# Patient Record
Sex: MALE
[De-identification: names, ages, dates, MRNs, and addresses within clinical notes are randomized per-mention and may not be internally consistent; named-entity substitution may affect disease eponyms.]

---

## 2019-07-10 ENCOUNTER — HOSPITAL ENCOUNTER (EMERGENCY)
Dept: HOSPITAL 89 - ER | Age: 39
Discharge: HOME | End: 2019-07-10
Payer: MEDICAID

## 2019-07-10 VITALS — SYSTOLIC BLOOD PRESSURE: 138 MMHG | DIASTOLIC BLOOD PRESSURE: 80 MMHG

## 2019-07-10 DIAGNOSIS — T70.29XA: Primary | ICD-10-CM

## 2019-07-10 LAB — PLATELET COUNT, AUTOMATED: 238 K/UL (ref 150–450)

## 2019-07-10 PROCEDURE — 84132 ASSAY OF SERUM POTASSIUM: CPT

## 2019-07-10 PROCEDURE — 82310 ASSAY OF CALCIUM: CPT

## 2019-07-10 PROCEDURE — 82565 ASSAY OF CREATININE: CPT

## 2019-07-10 PROCEDURE — 82375 ASSAY CARBOXYHB QUANT: CPT

## 2019-07-10 PROCEDURE — 84460 ALANINE AMINO (ALT) (SGPT): CPT

## 2019-07-10 PROCEDURE — 82247 BILIRUBIN TOTAL: CPT

## 2019-07-10 PROCEDURE — 84520 ASSAY OF UREA NITROGEN: CPT

## 2019-07-10 PROCEDURE — 96374 THER/PROPH/DIAG INJ IV PUSH: CPT

## 2019-07-10 PROCEDURE — 71046 X-RAY EXAM CHEST 2 VIEWS: CPT

## 2019-07-10 PROCEDURE — 85025 COMPLETE CBC W/AUTO DIFF WBC: CPT

## 2019-07-10 PROCEDURE — 84155 ASSAY OF PROTEIN SERUM: CPT

## 2019-07-10 PROCEDURE — 82947 ASSAY GLUCOSE BLOOD QUANT: CPT

## 2019-07-10 PROCEDURE — 82435 ASSAY OF BLOOD CHLORIDE: CPT

## 2019-07-10 PROCEDURE — 82374 ASSAY BLOOD CARBON DIOXIDE: CPT

## 2019-07-10 PROCEDURE — 84075 ASSAY ALKALINE PHOSPHATASE: CPT

## 2019-07-10 PROCEDURE — 99283 EMERGENCY DEPT VISIT LOW MDM: CPT

## 2019-07-10 PROCEDURE — 84450 TRANSFERASE (AST) (SGOT): CPT

## 2019-07-10 PROCEDURE — 84295 ASSAY OF SERUM SODIUM: CPT

## 2019-07-10 PROCEDURE — 96361 HYDRATE IV INFUSION ADD-ON: CPT

## 2019-07-10 PROCEDURE — 85379 FIBRIN DEGRADATION QUANT: CPT

## 2019-07-10 PROCEDURE — 82040 ASSAY OF SERUM ALBUMIN: CPT

## 2019-07-10 NOTE — ER REPORT
History and Physical


Time Seen By MD:  15:25


HPI/ROS


CHIEF COMPLAINT: dizzy, headache, sob





HISTORY OF PRESENT ILLNESS: PT is a  who is driving thru from 


[place] to [place].  Pt states he started feeling poorly when he hi


t [place]. States he is dizzy, nauseated, sob, headache and tired.  PT states 


symptoms have not improved from [place] to [place] so he stopped in the ED.  Pt 


states he usually does not drive this way. PT is not on any prophylaxis for  


altitude provention. No chest pain. 





REVIEW OF SYSTEMS:


Constitutional: No fever, no chills.


Eyes: No discharge.


ENT: No sore throat. 


Cardiovascular: No chest pain, no palpitations.


Respiratory: No cough, + shortness of breath.


Gastrointestinal: No abdominal pain, no vomiting.


Genitourinary: No hematuria.


Musculoskeletal: No back pain.


Skin: No rashes.


Neurological: + headache, + dizzy


Allergies:  


Coded Allergies:  


     No Known Drug Allergies (Unverified , 7/10/19)


Home Meds


No Active Prescriptions or Reported Meds


Past Medical/Surgical History


Pmhx: boarderline HTN, hemorrhoids


Hx Smoking:  No


Hx Alcohol Use:  Yes (Rarely, twice a month)


Constitutional





Vital Sign - Last 24 Hours








 7/10/19





 15:26


 


Temp 97.8


 


Pulse 81


 


Resp 20


 


B/P (MAP) 130/86


 


Pulse Ox 93


 


O2 Delivery Room Air








Physical Exam


General Appearance: The patient is alert, has no immediate need for airway 


protection and no signs of toxicity.


Eyes: Pupils equal and round no pallor or injection, EOMI


ENT:  no pharyngeal erythema or exudates, Mucous membranes are moist, TM are nl 


b/l


Respiratory: There are no retractions, lungs are clear to auscultation.


Cardiovascular: Regular rate and rhythm. pulses are equal and symmetrical


Gastrointestinal:  Abdomen is soft and non tender, no masses, bowel sounds 


normal, no guarding, no rigidity or rebound


Neurological: Cranial nerves II-XII grossly intact, no sensory or motor loss


Skin: Warm and dry, no rashes.


Musculoskeletal: Neck is supple non tender, no vertebral tenderness


Extremities are nontender, nonswollen and have full range of motion.








DIFFERENTIAL DIAGNOSIS: After history and physical exam differential diagnosis 


was considered for altitude sickness, dehydration, electrolylte abnl, pneumonia,


pe





Medical Decision Making


Data Points


Result Diagram:  


7/10/19 1528                                                                    


           7/10/19 1528





Laboratory





Hematology








Test


 7/10/19


15:28


 


White Blood Count


 13.1 k/uL


(4.5-11.0)  H


 


Red Blood Count


 5.60 M/uL


(4.00-5.60)


 


Hemoglobin


 15.4 g/dL


(14.0-18.0)


 


Hematocrit


 45.4 %


(42.0-52.0)


 


Mean Corpuscular Volume


 81.1 fL


(80.0-96.0)


 


Mean Corpuscular Hemoglobin


 27.5 pg


(26.0-33.0)


 


Mean Corpuscular Hemoglobin


Concent 33.9 g/dL


(32.0-36.0)


 


Red Cell Distribution Width


 14.1 %


(11.5-14.5)


 


Platelet Count


 238 K/uL


(150-450)


 


Mean Platelet Volume


 8.8 fL


(7.2-11.1)


 


Neutrophils (%) (Auto)


 73.0 %


(39.4-72.5)  H


 


Lymphocytes (%) (Auto)


 21.3 %


(17.6-49.6)


 


Monocytes (%) (Auto)


 4.1 %


(4.1-12.4)


 


Eosinophils (%) (Auto)


 1.3 %


(0.4-6.7)


 


Basophils (%) (Auto)


 0.3 %


(0.3-1.4)


 


Nucleated RBC Relative Count


(auto) 0.1 /100WBC  





 


Neutrophils # (Auto)


 9.6 K/uL


(2.0-7.4)  H


 


Lymphocytes # (Auto)


 2.8 K/uL


(1.3-3.6)


 


Monocytes # (Auto)


 0.5 K/uL


(0.3-1.0)


 


Eosinophils # (Auto)


 0.2 K/uL


(0.0-0.5)


 


Basophils # (Auto)


 0.0 K/uL


(0.0-0.1)


 


Nucleated RBC Absolute Count


(auto) 0.01 K/uL  











Chemistry








Test


 7/10/19


15:28


 


Sodium Level


 137 mmol/L


(137-145)


 


Potassium Level


 3.6 mmol/L


(3.5-5.0)


 


Chloride Level


 102 mmol/L


()


 


Carbon Dioxide Level


 24 mmol/L


(22-30)


 


Blood Urea Nitrogen


 13 mg/dl


(9-21)


 


Creatinine


 1.00 mg/dl


(0.66-1.25)


 


Glomerular Filtration Rate


Calc > 60.0 





 


Random Glucose


 123 mg/dl


()


 


Calcium Level


 9.0 mg/dl


(8.4-10.2)


 


Total Bilirubin


 0.5 mg/dl


(0.2-1.3)


 


Aspartate Amino Transf


(AST/SGOT) 33 U/L (0-35) 





 


Alanine Aminotransferase


(ALT/SGPT) 47 U/L (0-56) 





 


Alkaline Phosphatase


 103 U/L


(0-126)


 


Total Protein


 7.9 g/dl


(6.3-8.2)


 


Albumin


 4.2 g/dl


(3.5-5.0)








Coagulation








Test


 7/10/19


15:28


 


D-Dimer Quantitative (PE/DVT)


 < 0.27 ug/ml


(0-0.50)











EKG/Imaging


Imaging


napd





ED Course/Re-evaluation


Clinical Indication for ER IV:  Hydration, IV Access


ED Course


check labs, start acetazolamide





 07/10/2019 4:49:13 pm Pt feeling slightly better after fluids and medication 


but still has some symptoms.  Pts labs are stable. Pts symptoms consistent with 


acute altitude sickness. Spoke [facility] pt and he was going to stay in [facility] but now thinks he will drive a little further west to go to a lower 


altitude. will write for acetazolemide to take on his way back home.


Decision to Disposition Date:  Jul 10, 2019


Decision to Disposition Time:  16:50





Depart


Departure


Latest Vital Signs





Vital Signs








  Date Time  Temp Pulse Resp B/P (MAP) Pulse Ox O2 Delivery O2 Flow Rate FiO2


 


7/10/19 15:26 97.8 81 20 130/86 93 Room Air  








Impression:  


   Primary Impression:  


   Altitude sickness


Condition:  Improved


Disposition:  HOME OR SELF-CARE


New Scripts


Acetazolamide (ACETAZOLAMIDE) 250 Mg Tablet


250 MG PO BID, #14 TAB


   Prov: MYCHAL ELLISON DO         7/10/19


Patient Instructions:  Mountain Sickness (ED)





Additional Instructions:  


Your symptoms are most likely related to the high altitude (7,300 feet) above 


sea level. 


The treatment is to move to a lower altitude. 


You may also use tylenol for your headache.


You can take acetazolamide twice a day to help reduce your symptoms.   You do 


not need to take it if you are not having symptoms or if you move to a lower 


altitude.  If you need to drive back thru [place] or [place] I recommend you start 


the acetazolamide a day before you reach the high altitude region.





Problem Qualifiers








   Primary Impression:  


   Altitude sickness


   Encounter type:  initial encounter  Qualified Codes:  T70.29XA - Other 


   effects of high altitude, initial encounter








MYCHAL ELLISON DO            Jul 10, 2019 15:27

## 2019-07-10 NOTE — RADIOLOGY IMAGING REPORT
FACILITY: [facility] - [place] 

 

PATIENT NAME: Gallo Whatley

: 1980

MR: [id number]

V: [id number]

EXAM DATE: 

ORDERING PHYSICIAN: MYCHAL ELLISON

TECHNOLOGIST: 

 

Location: [facility]

Patient: Gallo Whatley

: 1980

MRN: [id number]

Visit/Account:[id number]

Date of Sevice:  7/10/2019

 

ACCESSION #: [id number]

 

CHEST PA LAT

 

INDICATION:  sob

 

COMPARISON: None available

 

FINDINGS:

Heart size within normal limits.

There is no focal infiltrate or lobar consolidation.

There is no pneumothorax or pleural effusion.

 

 

IMPRESSION:

1. No acute cardiopulmonary process.

 

Report Dictated By: Lorne Ashton at 7/10/2019 4:18 PM

 

Report E-Signed By: Lorne Ashton  at 7/10/2019 4:18 PM

 

WSN:[id number]